# Patient Record
Sex: FEMALE | Race: OTHER | HISPANIC OR LATINO
[De-identification: names, ages, dates, MRNs, and addresses within clinical notes are randomized per-mention and may not be internally consistent; named-entity substitution may affect disease eponyms.]

---

## 2021-08-09 ENCOUNTER — APPOINTMENT (OUTPATIENT)
Dept: DERMATOLOGY | Facility: CLINIC | Age: 46
End: 2021-08-09
Payer: COMMERCIAL

## 2021-08-09 VITALS — BODY MASS INDEX: 22.44 KG/M2 | WEIGHT: 143 LBS | HEIGHT: 67 IN

## 2021-08-09 DIAGNOSIS — L81.1 CHLOASMA: ICD-10-CM

## 2021-08-09 PROCEDURE — 99204 OFFICE O/P NEW MOD 45 MIN: CPT

## 2021-08-09 RX ORDER — HYDROQUINONE 40 MG/G
4 CREAM TOPICAL TWICE DAILY
Qty: 1 | Refills: 0 | Status: ACTIVE | COMMUNITY
Start: 2021-08-09 | End: 1900-01-01

## 2023-09-25 ENCOUNTER — EMERGENCY (EMERGENCY)
Facility: HOSPITAL | Age: 48
LOS: 1 days | Discharge: HOME | End: 2023-09-25
Attending: STUDENT IN AN ORGANIZED HEALTH CARE EDUCATION/TRAINING PROGRAM
Payer: COMMERCIAL

## 2023-09-25 VITALS
WEIGHT: 143.08 LBS | OXYGEN SATURATION: 100 % | SYSTOLIC BLOOD PRESSURE: 117 MMHG | TEMPERATURE: 98 F | RESPIRATION RATE: 18 BRPM | HEART RATE: 77 BPM | DIASTOLIC BLOOD PRESSURE: 73 MMHG

## 2023-09-25 LAB
ALBUMIN SERPL ELPH-MCNC: 3.6 G/DL — SIGNIFICANT CHANGE UP (ref 3.5–5)
ALP SERPL-CCNC: 71 U/L — SIGNIFICANT CHANGE UP (ref 40–120)
ALT FLD-CCNC: 17 U/L DA — SIGNIFICANT CHANGE UP (ref 10–60)
ANION GAP SERPL CALC-SCNC: 6 MMOL/L — SIGNIFICANT CHANGE UP (ref 5–17)
APPEARANCE UR: CLEAR — SIGNIFICANT CHANGE UP
AST SERPL-CCNC: 19 U/L — SIGNIFICANT CHANGE UP (ref 10–40)
BASOPHILS # BLD AUTO: 0.03 K/UL — SIGNIFICANT CHANGE UP (ref 0–0.2)
BASOPHILS NFR BLD AUTO: 0.5 % — SIGNIFICANT CHANGE UP (ref 0–2)
BILIRUB SERPL-MCNC: 0.6 MG/DL — SIGNIFICANT CHANGE UP (ref 0.2–1.2)
BILIRUB UR-MCNC: NEGATIVE — SIGNIFICANT CHANGE UP
BUN SERPL-MCNC: 13 MG/DL — SIGNIFICANT CHANGE UP (ref 7–18)
CALCIUM SERPL-MCNC: 8.7 MG/DL — SIGNIFICANT CHANGE UP (ref 8.4–10.5)
CHLORIDE SERPL-SCNC: 108 MMOL/L — SIGNIFICANT CHANGE UP (ref 96–108)
CO2 SERPL-SCNC: 24 MMOL/L — SIGNIFICANT CHANGE UP (ref 22–31)
COLOR SPEC: YELLOW — SIGNIFICANT CHANGE UP
CREAT SERPL-MCNC: 0.69 MG/DL — SIGNIFICANT CHANGE UP (ref 0.5–1.3)
DIFF PNL FLD: NEGATIVE — SIGNIFICANT CHANGE UP
EGFR: 108 ML/MIN/1.73M2 — SIGNIFICANT CHANGE UP
EOSINOPHIL # BLD AUTO: 0.06 K/UL — SIGNIFICANT CHANGE UP (ref 0–0.5)
EOSINOPHIL NFR BLD AUTO: 1 % — SIGNIFICANT CHANGE UP (ref 0–6)
GLUCOSE SERPL-MCNC: 95 MG/DL — SIGNIFICANT CHANGE UP (ref 70–99)
GLUCOSE UR QL: NEGATIVE MG/DL — SIGNIFICANT CHANGE UP
HCG SERPL-ACNC: HIGH MIU/ML
HCG UR QL: POSITIVE
HCT VFR BLD CALC: 40 % — SIGNIFICANT CHANGE UP (ref 34.5–45)
HGB BLD-MCNC: 13.3 G/DL — SIGNIFICANT CHANGE UP (ref 11.5–15.5)
IMM GRANULOCYTES NFR BLD AUTO: 0.3 % — SIGNIFICANT CHANGE UP (ref 0–0.9)
KETONES UR-MCNC: ABNORMAL MG/DL
LEUKOCYTE ESTERASE UR-ACNC: NEGATIVE — SIGNIFICANT CHANGE UP
LIDOCAIN IGE QN: 43 U/L — SIGNIFICANT CHANGE UP (ref 13–75)
LYMPHOCYTES # BLD AUTO: 1.94 K/UL — SIGNIFICANT CHANGE UP (ref 1–3.3)
LYMPHOCYTES # BLD AUTO: 31.4 % — SIGNIFICANT CHANGE UP (ref 13–44)
MCHC RBC-ENTMCNC: 29.7 PG — SIGNIFICANT CHANGE UP (ref 27–34)
MCHC RBC-ENTMCNC: 33.3 GM/DL — SIGNIFICANT CHANGE UP (ref 32–36)
MCV RBC AUTO: 89.3 FL — SIGNIFICANT CHANGE UP (ref 80–100)
MONOCYTES # BLD AUTO: 0.37 K/UL — SIGNIFICANT CHANGE UP (ref 0–0.9)
MONOCYTES NFR BLD AUTO: 6 % — SIGNIFICANT CHANGE UP (ref 2–14)
NEUTROPHILS # BLD AUTO: 3.75 K/UL — SIGNIFICANT CHANGE UP (ref 1.8–7.4)
NEUTROPHILS NFR BLD AUTO: 60.8 % — SIGNIFICANT CHANGE UP (ref 43–77)
NITRITE UR-MCNC: NEGATIVE — SIGNIFICANT CHANGE UP
NRBC # BLD: 0 /100 WBCS — SIGNIFICANT CHANGE UP (ref 0–0)
PH UR: 5.5 — SIGNIFICANT CHANGE UP (ref 5–8)
PLATELET # BLD AUTO: 242 K/UL — SIGNIFICANT CHANGE UP (ref 150–400)
POTASSIUM SERPL-MCNC: 4.5 MMOL/L — SIGNIFICANT CHANGE UP (ref 3.5–5.3)
POTASSIUM SERPL-SCNC: 4.5 MMOL/L — SIGNIFICANT CHANGE UP (ref 3.5–5.3)
PROT SERPL-MCNC: 7.5 G/DL — SIGNIFICANT CHANGE UP (ref 6–8.3)
PROT UR-MCNC: NEGATIVE MG/DL — SIGNIFICANT CHANGE UP
RBC # BLD: 4.48 M/UL — SIGNIFICANT CHANGE UP (ref 3.8–5.2)
RBC # FLD: 13.3 % — SIGNIFICANT CHANGE UP (ref 10.3–14.5)
SODIUM SERPL-SCNC: 138 MMOL/L — SIGNIFICANT CHANGE UP (ref 135–145)
SP GR SPEC: 1.02 — SIGNIFICANT CHANGE UP (ref 1–1.03)
UROBILINOGEN FLD QL: 1 MG/DL — SIGNIFICANT CHANGE UP (ref 0.2–1)
WBC # BLD: 6.17 K/UL — SIGNIFICANT CHANGE UP (ref 3.8–10.5)
WBC # FLD AUTO: 6.17 K/UL — SIGNIFICANT CHANGE UP (ref 3.8–10.5)

## 2023-09-25 PROCEDURE — 76817 TRANSVAGINAL US OBSTETRIC: CPT | Mod: 26

## 2023-09-25 PROCEDURE — 76801 OB US < 14 WKS SINGLE FETUS: CPT | Mod: 26

## 2023-09-25 PROCEDURE — 99284 EMERGENCY DEPT VISIT MOD MDM: CPT

## 2023-09-25 RX ORDER — ACETAMINOPHEN 500 MG
650 TABLET ORAL ONCE
Refills: 0 | Status: COMPLETED | OUTPATIENT
Start: 2023-09-25 | End: 2023-09-25

## 2023-09-25 RX ORDER — SODIUM CHLORIDE 9 MG/ML
1000 INJECTION INTRAMUSCULAR; INTRAVENOUS; SUBCUTANEOUS ONCE
Refills: 0 | Status: COMPLETED | OUTPATIENT
Start: 2023-09-25 | End: 2023-09-25

## 2023-09-25 RX ADMIN — Medication 650 MILLIGRAM(S): at 22:12

## 2023-09-25 RX ADMIN — SODIUM CHLORIDE 1000 MILLILITER(S): 9 INJECTION INTRAMUSCULAR; INTRAVENOUS; SUBCUTANEOUS at 22:11

## 2023-09-25 NOTE — ED ADULT TRIAGE NOTE - CHIEF COMPLAINT QUOTE
I have lower abdominal pain, back pain, yellowish vaginal discharge x 1 week.  I have a positive pregnancy test on 09/22/3023

## 2023-09-25 NOTE — ED PROVIDER NOTE - ATTENDING APP SHARED VISIT CONTRIBUTION OF CARE
I, Avani Mccall DO reviewed the [facility] plan of care and discussed the case with the ACP.  I was available for any questions and concerns

## 2023-09-25 NOTE — ED PROVIDER NOTE - PATIENT PORTAL LINK FT
You can access the FollowMyHealth Patient Portal offered by [facility] by registering at the following website: [email or web address]. By joining [facility]’s FollowMyHealth portal, you will also be able to view your health information using other applications (apps) compatible with our system.

## 2023-09-25 NOTE — ED ADULT NURSE NOTE - NSSEPSISSUSPECTED_ED_A_ED
Patient was advised of below recommendations per Dr. Raul Davis    Patient states she is currently taking clindamycin 450mg three times daily as she just had a back procedure. States she will be done taking it on Wednesday. States she is wondering if she still needs to take more clindamycin before her dental appointment? Dental appointment is on 3/22. Please advise.    No

## 2023-09-25 NOTE — ED PROVIDER NOTE - NSFOLLOWUPINSTRUCTIONS_ED_ALL_ED_FT
1) Follow up with either your doctor within 48hrs or return to ED for the repeat blood work check.   2) Return to the ED immediately for new or worsening symptoms like severe abdominal pain, vomiting, fever, inability to eat or drink.   3) Please continue to take any home medications as prescribed  4) Your test results from your ED visit were discussed with you prior to discharge  5) You were provided with a copy of your test results    Abdominal Pain During Pregnancy  Abdominal pain is common during pregnancy and has many possible causes. Some causes are more serious than others, and sometimes the cause is not known.    Abdominal pain can be a sign that labor is starting. It can also be caused by normal growth of your baby causing stretching of muscles and ligaments during pregnancy. Always tell your health care provider if you have any abdominal pain.    Follow these instructions at home:    Do not have sex or put anything in your vagina until your pain goes away completely.  Get plenty of rest until your pain improves.  Drink enough fluid to keep your urine pale yellow.  Take over-the-counter and prescription medicines only as told by your health care provider.  Keep all follow-up visits. This is important.  Contact a health care provider if:  Your pain continues or gets worse after resting.  You have lower abdominal pain that:  Comes and goes at regular intervals.  Spreads to your back.  Is similar to menstrual cramps.  You have pain or burning when you urinate.  Get help right away if:  You have a fever, chills, or shortness of breath.  You have vaginal bleeding.  You are leaking fluid or passing tissue from your vagina.  You have vomiting or diarrhea that lasts for more than 24 hours.  Your baby is moving less than usual.  You feel very weak or faint.  You develop severe pain in your upper abdomen.  Summary  Abdominal pain is common during pregnancy and has many possible causes.  If you experience abdominal pain during pregnancy, tell your health care provider right away.  Follow your health care provider's home care instructions and keep all follow-up visits as told.  This information is not intended to replace advice given to you by your health care provider. Make sure you discuss any questions you have with your health care provider.

## 2023-09-25 NOTE — ED PROVIDER NOTE - CLINICAL SUMMARY MEDICAL DECISION MAKING FREE TEXT BOX
48 y/o F PMH ovarian cysts  currently approx 10 weeks pregnant presenting to ED for abd pain. urinary frequency x 1 week. Pt states her LMP was , was having hot flashes and thought she was beginning menopause. Took a pregnancy test on  and noted a "faint positive line." Was unable to make appt with outpatient PMD prompting ED arrival. Also endorsing yellow vaginal discharge but states this is not uncommon for her. No abnormal vaginal bleeding. Denies CP, SOB, fevers, n/v/d. PE notable for RLQ and suprapubic ttp, concerning for ectopic pregnancy, ovarian torsion, UTI, ovarian cysts, possibly appendicitis, if upreg negative, may require additional imaging to eval for pain, plan for labs, analgesia, US, reassess, dispo accordingly

## 2023-09-25 NOTE — ED PROVIDER NOTE - PROGRESS NOTE DETAILS
b-hcg 15,835. US showing Intrauterine gestational sac with mean sac diameter of 2.5 cm   corresponding to a 7 week 1 day gestation. No internal yolk sac or fetal   pole identified. Small subchorionic hematoma measuring 1.6 x 0.5 x 1.0   cm.     Discussed results with patient, aware she needs to follow up for rpt b-hcg and possible US, all questions answered and ectopic precautions given to patient.

## 2023-09-25 NOTE — ED PROVIDER NOTE - NS ED ROS FT
GENERAL: +CHILLS  EYES: No change in vision  HEENT: No trouble swallowing or speaking  CARDIAC: No chest pain  PULMONARY: No cough or SOB  GI: +ABD PAIN, no nausea or no vomiting, no diarrhea or constipation  : + URINARY FREQUENCY, +YELLOW VAGINAL DISCHARGE  SKIN: No rashes  NEURO: No headache, no numbness  MSK: No joint pain  Otherwise as HPI or negative.

## 2023-09-25 NOTE — ED PROVIDER NOTE - PHYSICAL EXAMINATION
Gen: NAD, AOx3, able to make needs known, non-toxic  Head: NCAT  HEENT: EOMI, oral mucosa moist, normal conjunctiva  Lung: CTAB, no respiratory distress, no wheezes/rhonchi/rales B/L, speaking in full sentences  CV: RRR, no murmurs  Abd: + RLQ and suprapubic tenderness, non distended, soft,  no guarding, no CVA tenderness  MSK: no visible deformities  Neuro: Appears non focal  Skin: Warm, well perfused, no rash  Psych: normal affect

## 2023-09-25 NOTE — ED ADULT NURSE NOTE - NSFALLUNIVINTERV_ED_ALL_ED
Bed/Stretcher in lowest position, wheels locked, appropriate side rails in place/Call bell, personal items and telephone in reach/Instruct patient to call for assistance before getting out of bed/chair/stretcher/Non-slip footwear applied when patient is off stretcher/[place] to call system/Physically safe environment - no spills, clutter or unnecessary equipment/Purposeful proactive rounding/Room/bathroom lighting operational, light cord in reach

## 2023-09-26 VITALS
TEMPERATURE: 99 F | OXYGEN SATURATION: 100 % | SYSTOLIC BLOOD PRESSURE: 117 MMHG | RESPIRATION RATE: 16 BRPM | HEART RATE: 68 BPM | DIASTOLIC BLOOD PRESSURE: 79 MMHG

## 2023-09-26 LAB
C TRACH RRNA SPEC QL NAA+PROBE: SIGNIFICANT CHANGE UP
CULTURE RESULTS: SIGNIFICANT CHANGE UP
N GONORRHOEA RRNA SPEC QL NAA+PROBE: SIGNIFICANT CHANGE UP
SPECIMEN SOURCE: SIGNIFICANT CHANGE UP

## 2023-09-26 PROCEDURE — 76830 TRANSVAGINAL US NON-OB: CPT

## 2023-09-26 PROCEDURE — 99284 EMERGENCY DEPT VISIT MOD MDM: CPT | Mod: 25

## 2023-09-26 PROCEDURE — 36415 COLL VENOUS BLD VENIPUNCTURE: CPT

## 2023-09-26 PROCEDURE — 83690 ASSAY OF LIPASE: CPT

## 2023-09-26 PROCEDURE — 81025 URINE PREGNANCY TEST: CPT

## 2023-09-26 PROCEDURE — 85025 COMPLETE CBC W/AUTO DIFF WBC: CPT

## 2023-09-26 PROCEDURE — 80053 COMPREHEN METABOLIC PANEL: CPT

## 2023-09-26 PROCEDURE — 87086 URINE CULTURE/COLONY COUNT: CPT

## 2023-09-26 PROCEDURE — 81003 URINALYSIS AUTO W/O SCOPE: CPT

## 2023-09-26 PROCEDURE — 87591 N.GONORRHOEAE DNA AMP PROB: CPT

## 2023-09-26 PROCEDURE — 76802 OB US < 14 WKS ADDL FETUS: CPT

## 2023-09-26 PROCEDURE — 87491 CHLMYD TRACH DNA AMP PROBE: CPT

## 2023-09-26 PROCEDURE — 84702 CHORIONIC GONADOTROPIN TEST: CPT

## 2024-04-28 ENCOUNTER — EMERGENCY (EMERGENCY)
Facility: HOSPITAL | Age: 49
LOS: 1 days | Discharge: HOME | End: 2024-04-28
Attending: EMERGENCY MEDICINE
Payer: COMMERCIAL

## 2024-04-28 VITALS
SYSTOLIC BLOOD PRESSURE: 123 MMHG | OXYGEN SATURATION: 99 % | HEIGHT: 68.5 IN | RESPIRATION RATE: 14 BRPM | TEMPERATURE: 98 F | DIASTOLIC BLOOD PRESSURE: 72 MMHG | HEART RATE: 76 BPM | WEIGHT: 138.67 LBS

## 2024-04-28 PROCEDURE — 96374 THER/PROPH/DIAG INJ IV PUSH: CPT

## 2024-04-28 PROCEDURE — 99284 EMERGENCY DEPT VISIT MOD MDM: CPT | Mod: 25

## 2024-04-28 PROCEDURE — 99284 EMERGENCY DEPT VISIT MOD MDM: CPT

## 2024-04-28 PROCEDURE — 70450 CT HEAD/BRAIN W/O DYE: CPT | Mod: 26,MC

## 2024-04-28 PROCEDURE — 96375 TX/PRO/DX INJ NEW DRUG ADDON: CPT

## 2024-04-28 PROCEDURE — 70450 CT HEAD/BRAIN W/O DYE: CPT | Mod: MC

## 2024-04-28 RX ORDER — METOCLOPRAMIDE HCL 10 MG
10 TABLET ORAL ONCE
Refills: 0 | Status: COMPLETED | OUTPATIENT
Start: 2024-04-28 | End: 2024-04-28

## 2024-04-28 RX ORDER — SODIUM CHLORIDE 9 MG/ML
1000 INJECTION INTRAMUSCULAR; INTRAVENOUS; SUBCUTANEOUS ONCE
Refills: 0 | Status: COMPLETED | OUTPATIENT
Start: 2024-04-28 | End: 2024-04-28

## 2024-04-28 RX ORDER — KETOROLAC TROMETHAMINE 30 MG/ML
15 SYRINGE (ML) INJECTION ONCE
Refills: 0 | Status: DISCONTINUED | OUTPATIENT
Start: 2024-04-28 | End: 2024-04-28

## 2024-04-28 RX ORDER — ACETAMINOPHEN 500 MG
1000 TABLET ORAL ONCE
Refills: 0 | Status: COMPLETED | OUTPATIENT
Start: 2024-04-28 | End: 2024-04-28

## 2024-04-28 RX ADMIN — Medication 15 MILLIGRAM(S): at 14:13

## 2024-04-28 RX ADMIN — Medication 10 MILLIGRAM(S): at 12:35

## 2024-04-28 RX ADMIN — Medication 400 MILLIGRAM(S): at 12:34

## 2024-04-28 RX ADMIN — SODIUM CHLORIDE 1000 MILLILITER(S): 9 INJECTION INTRAMUSCULAR; INTRAVENOUS; SUBCUTANEOUS at 12:34

## 2024-04-28 RX ADMIN — Medication 1000 MILLIGRAM(S): at 13:04

## 2024-04-28 RX ADMIN — Medication 15 MILLIGRAM(S): at 14:35

## 2024-04-28 NOTE — ED PROVIDER NOTE - PATIENT PORTAL LINK FT
You can access the FollowMyHealth Patient Portal offered by [facility] by registering at the following website: [email or web address]. By joining [facility]’s FollowMyHealth portal, you will also be able to view your health information using other applications (apps) compatible with our system.

## 2024-04-28 NOTE — ED PROVIDER NOTE - ATTENDING APP SHARED VISIT CONTRIBUTION OF CARE
Seen with ACP complaining of 3-day history of intractable headache with nausea not tolerating p.o. patient has a history of headaches had an MRI in the past which was negative physical exam is unremarkable patient in moderate distress no neurodeficits patient given IV Tylenol and Reglan with improvement CT scan is negative agree with ACPs assessment history and physical and [id number]!

## 2024-04-28 NOTE — ED PROVIDER NOTE - CLINICAL SUMMARY MEDICAL DECISION MAKING FREE TEXT BOX
48-year-old female, presents for evaluation of persistent headache for 3 days.  Nontoxic-appearing, vital signs stable, afebrile.  No signs of meningeal irritation.  Unremarkable neurological exam.  No cerebellar deficit.  Patient will require a CT to assess for acute intracranial injury.  At this time there is a low suspicion for  dissection or meningitis.  Will give IV meds, IV fluids and reassess. 48-year-old female, presents for evaluation of persistent headache for 3 days.  Nontoxic-appearing, vital signs stable, afebrile.  No signs of meningeal irritation.  Unremarkable neurological exam.  No cerebellar deficit.  Patient will require a CT to assess for acute intracranial injury.  At this time there is a low suspicion for  dissection or meningitis.  Will give IV meds, IV fluids and reassess.    14: 09–patient feels significant improvement of headache although not completely resolved.  Again unremarkable neurological exam.  CT head negative.  Patient already has neurology follow-up this coming Wednesday.  Discussed red flags to come back to the hospital and home care.  Answered all questions.

## 2024-04-28 NOTE — ED ADULT NURSE NOTE - OBJECTIVE STATEMENT
Pt presents to the ED with c/o headache x 1 week and nausea and dizziness yesterday. Pt report taking aleve without improvement. Last took aleve at 9am yesterday.

## 2024-04-28 NOTE — ED PROVIDER NOTE - PHYSICAL EXAMINATION
Head is normocephalic and atraumatic. No head tenderness or skull depression on palpation. No temporal cord-like sensation, increased warmth or tenderness. Pt is alert and oriented x 3, able to follow commands. No facial asymmetry. Pupils 5 mm equally round with PERRL, no nystagmus, EOM intact. Cranial nerves III-XII grossly intact. Coordination nose-to-finger intact. All limbs equally strong bilaterally 5/5. No pronator drift. No spinal/paraspinal tenderness. Neck is non-tender, supple with full range of motion. No nuchal rigidity.

## 2024-04-28 NOTE — ED PROVIDER NOTE - OBJECTIVE STATEMENT
48-year-old female, history of migraine (usually takes Aleve, was evaluated by neurologist in the past and had MRI over 10 years ago), presents with persistent headache for 3 days.  Reports gradual onset of nonexertional headache 1 week ago.  No specific triggers.  Headache localized in the generalized area of the head and sometimes across the forehead/neck/shoulders.  At times describes headache as throbbing. During the week Aleve was able to alleviate partially her symptoms but in the last 3 days it has been continuous.  Had 2 episodes of NBNB vomiting in the last 1 week.   Also reports had episodic right hand or right foot numbness that lasted for 1 hour.  No associated weakness.  No recent paresthesia in the last few days.  Denies any recent febrile or viral illness.  Denies any vision changes, photophobia, unsteady gait, dizziness difficulty talking  or any other complaints.  Reports the headache presentation is similar but this time it is much more intense and more persistent.

## 2024-04-28 NOTE — ED ADULT NURSE NOTE - ED STAT RN HANDOFF DETAILS
Patient discharged home as per MD order. IV access removed no redness, swelling, or bleeding noted at site. All discharge instructions provided to patient. Patient verbalized understanding and left ambulatory in no distress.

## 2024-04-28 NOTE — ED PROVIDER NOTE - NSFOLLOWUPINSTRUCTIONS_ED_ALL_ED_FT
Follow-up with the neurologist as per your appointment.  For pain you can take Aleve 500 mg orally every 12 hours in combination with Tylenol 1000 mg orally every 6 hours as needed for pain/fever (max 4000 mg in 24 hours).    If you experience any new or worsening symptoms or if you are concerned you can always come back to the emergency for a re-evaluation.    Some results may not be available at the time of your discharge from the hospital. You can download the FOLLOW MY HEALTH trinity and have access to these results.

## 2025-06-09 ENCOUNTER — EMERGENCY (EMERGENCY)
Facility: HOSPITAL | Age: 50
LOS: 1 days | End: 2025-06-09
Attending: EMERGENCY MEDICINE
Payer: COMMERCIAL

## 2025-06-09 VITALS
DIASTOLIC BLOOD PRESSURE: 69 MMHG | SYSTOLIC BLOOD PRESSURE: 104 MMHG | TEMPERATURE: 98 F | OXYGEN SATURATION: 98 % | HEART RATE: 58 BPM | RESPIRATION RATE: 18 BRPM

## 2025-06-09 VITALS
DIASTOLIC BLOOD PRESSURE: 80 MMHG | HEART RATE: 75 BPM | TEMPERATURE: 98 F | SYSTOLIC BLOOD PRESSURE: 125 MMHG | OXYGEN SATURATION: 100 % | RESPIRATION RATE: 18 BRPM | HEIGHT: 67 IN | WEIGHT: 158.73 LBS

## 2025-06-09 LAB
ALBUMIN SERPL ELPH-MCNC: 3.2 G/DL — LOW (ref 3.5–5)
ALP SERPL-CCNC: 89 U/L — SIGNIFICANT CHANGE UP (ref 40–120)
ALT FLD-CCNC: 22 U/L DA — SIGNIFICANT CHANGE UP (ref 10–60)
ANION GAP SERPL CALC-SCNC: 3 MMOL/L — LOW (ref 5–17)
APPEARANCE UR: ABNORMAL
AST SERPL-CCNC: 15 U/L — SIGNIFICANT CHANGE UP (ref 10–40)
BASOPHILS # BLD AUTO: 0.03 K/UL — SIGNIFICANT CHANGE UP (ref 0–0.2)
BASOPHILS NFR BLD AUTO: 0.6 % — SIGNIFICANT CHANGE UP (ref 0–2)
BILIRUB SERPL-MCNC: 0.5 MG/DL — SIGNIFICANT CHANGE UP (ref 0.2–1.2)
BILIRUB UR-MCNC: NEGATIVE — SIGNIFICANT CHANGE UP
BUN SERPL-MCNC: 13 MG/DL — SIGNIFICANT CHANGE UP (ref 7–18)
CALCIUM SERPL-MCNC: 8.2 MG/DL — LOW (ref 8.4–10.5)
CHLORIDE SERPL-SCNC: 108 MMOL/L — SIGNIFICANT CHANGE UP (ref 96–108)
CK SERPL-CCNC: 111 U/L — SIGNIFICANT CHANGE UP (ref 21–215)
CO2 SERPL-SCNC: 29 MMOL/L — SIGNIFICANT CHANGE UP (ref 22–31)
COLOR SPEC: YELLOW — SIGNIFICANT CHANGE UP
CREAT SERPL-MCNC: 0.77 MG/DL — SIGNIFICANT CHANGE UP (ref 0.5–1.3)
DIFF PNL FLD: ABNORMAL
EGFR: 94 ML/MIN/1.73M2 — SIGNIFICANT CHANGE UP
EGFR: 94 ML/MIN/1.73M2 — SIGNIFICANT CHANGE UP
EOSINOPHIL # BLD AUTO: 0.21 K/UL — SIGNIFICANT CHANGE UP (ref 0–0.5)
EOSINOPHIL NFR BLD AUTO: 4.5 % — SIGNIFICANT CHANGE UP (ref 0–6)
FLUAV AG NPH QL: SIGNIFICANT CHANGE UP
FLUBV AG NPH QL: SIGNIFICANT CHANGE UP
GLUCOSE SERPL-MCNC: 146 MG/DL — HIGH (ref 70–99)
GLUCOSE UR QL: NEGATIVE MG/DL — SIGNIFICANT CHANGE UP
HCG SERPL-ACNC: <1 MIU/ML — SIGNIFICANT CHANGE UP
HCT VFR BLD CALC: 34.4 % — LOW (ref 34.5–45)
HGB BLD-MCNC: 11.4 G/DL — LOW (ref 11.5–15.5)
IMM GRANULOCYTES NFR BLD AUTO: 0.2 % — SIGNIFICANT CHANGE UP (ref 0–0.9)
KETONES UR QL: NEGATIVE MG/DL — SIGNIFICANT CHANGE UP
LEUKOCYTE ESTERASE UR-ACNC: ABNORMAL
LYMPHOCYTES # BLD AUTO: 1.57 K/UL — SIGNIFICANT CHANGE UP (ref 1–3.3)
LYMPHOCYTES # BLD AUTO: 34 % — SIGNIFICANT CHANGE UP (ref 13–44)
MCHC RBC-ENTMCNC: 29.6 PG — SIGNIFICANT CHANGE UP (ref 27–34)
MCHC RBC-ENTMCNC: 33.1 G/DL — SIGNIFICANT CHANGE UP (ref 32–36)
MCV RBC AUTO: 89.4 FL — SIGNIFICANT CHANGE UP (ref 80–100)
MONOCYTES # BLD AUTO: 0.44 K/UL — SIGNIFICANT CHANGE UP (ref 0–0.9)
MONOCYTES NFR BLD AUTO: 9.5 % — SIGNIFICANT CHANGE UP (ref 2–14)
NEUTROPHILS # BLD AUTO: 2.36 K/UL — SIGNIFICANT CHANGE UP (ref 1.8–7.4)
NEUTROPHILS NFR BLD AUTO: 51.2 % — SIGNIFICANT CHANGE UP (ref 43–77)
NITRITE UR-MCNC: NEGATIVE — SIGNIFICANT CHANGE UP
NRBC BLD AUTO-RTO: 0 /100 WBCS — SIGNIFICANT CHANGE UP (ref 0–0)
PH UR: 7 — SIGNIFICANT CHANGE UP (ref 5–8)
PLATELET # BLD AUTO: 237 K/UL — SIGNIFICANT CHANGE UP (ref 150–400)
POTASSIUM SERPL-MCNC: 3.4 MMOL/L — LOW (ref 3.5–5.3)
POTASSIUM SERPL-SCNC: 3.4 MMOL/L — LOW (ref 3.5–5.3)
PROT SERPL-MCNC: 6.4 G/DL — SIGNIFICANT CHANGE UP (ref 6–8.3)
PROT UR-MCNC: ABNORMAL MG/DL
RBC # BLD: 3.85 M/UL — SIGNIFICANT CHANGE UP (ref 3.8–5.2)
RBC # FLD: 13.9 % — SIGNIFICANT CHANGE UP (ref 10.3–14.5)
RSV RNA NPH QL NAA+NON-PROBE: SIGNIFICANT CHANGE UP
SARS-COV-2 RNA SPEC QL NAA+PROBE: SIGNIFICANT CHANGE UP
SODIUM SERPL-SCNC: 140 MMOL/L — SIGNIFICANT CHANGE UP (ref 135–145)
SOURCE RESPIRATORY: SIGNIFICANT CHANGE UP
SP GR SPEC: 1.02 — SIGNIFICANT CHANGE UP (ref 1–1.03)
TROPONIN I, HIGH SENSITIVITY RESULT: 3.5 NG/L — SIGNIFICANT CHANGE UP
UROBILINOGEN FLD QL: 1 MG/DL — SIGNIFICANT CHANGE UP (ref 0.2–1)
WBC # BLD: 4.62 K/UL — SIGNIFICANT CHANGE UP (ref 3.8–10.5)
WBC # FLD AUTO: 4.62 K/UL — SIGNIFICANT CHANGE UP (ref 3.8–10.5)

## 2025-06-09 PROCEDURE — 96375 TX/PRO/DX INJ NEW DRUG ADDON: CPT

## 2025-06-09 PROCEDURE — 82550 ASSAY OF CK (CPK): CPT

## 2025-06-09 PROCEDURE — 87637 SARSCOV2&INF A&B&RSV AMP PRB: CPT

## 2025-06-09 PROCEDURE — 84702 CHORIONIC GONADOTROPIN TEST: CPT

## 2025-06-09 PROCEDURE — 87086 URINE CULTURE/COLONY COUNT: CPT

## 2025-06-09 PROCEDURE — 84484 ASSAY OF TROPONIN QUANT: CPT

## 2025-06-09 PROCEDURE — 85025 COMPLETE CBC W/AUTO DIFF WBC: CPT

## 2025-06-09 PROCEDURE — 71046 X-RAY EXAM CHEST 2 VIEWS: CPT | Mod: 26

## 2025-06-09 PROCEDURE — 0241U: CPT

## 2025-06-09 PROCEDURE — 99285 EMERGENCY DEPT VISIT HI MDM: CPT | Mod: 25

## 2025-06-09 PROCEDURE — 80053 COMPREHEN METABOLIC PANEL: CPT

## 2025-06-09 PROCEDURE — 71046 X-RAY EXAM CHEST 2 VIEWS: CPT

## 2025-06-09 PROCEDURE — 93005 ELECTROCARDIOGRAM TRACING: CPT

## 2025-06-09 PROCEDURE — 96374 THER/PROPH/DIAG INJ IV PUSH: CPT

## 2025-06-09 PROCEDURE — 81001 URINALYSIS AUTO W/SCOPE: CPT

## 2025-06-09 PROCEDURE — 87077 CULTURE AEROBIC IDENTIFY: CPT

## 2025-06-09 PROCEDURE — 93010 ELECTROCARDIOGRAM REPORT: CPT

## 2025-06-09 PROCEDURE — 99285 EMERGENCY DEPT VISIT HI MDM: CPT

## 2025-06-09 PROCEDURE — 36415 COLL VENOUS BLD VENIPUNCTURE: CPT

## 2025-06-09 RX ORDER — METOCLOPRAMIDE HCL 10 MG
10 TABLET ORAL ONCE
Refills: 0 | Status: COMPLETED | OUTPATIENT
Start: 2025-06-09 | End: 2025-06-09

## 2025-06-09 RX ORDER — ACETAMINOPHEN 500 MG/5ML
1000 LIQUID (ML) ORAL ONCE
Refills: 0 | Status: COMPLETED | OUTPATIENT
Start: 2025-06-09 | End: 2025-06-09

## 2025-06-09 RX ADMIN — Medication 400 MILLIGRAM(S): at 18:52

## 2025-06-09 RX ADMIN — Medication 10 MILLIGRAM(S): at 18:51

## 2025-06-09 NOTE — ED PROVIDER NOTE - CLINICAL SUMMARY MEDICAL DECISION MAKING FREE TEXT BOX
This is a 49-year-old female, no significant medical history, presenting to the emergency department today for palpitations.  The patient states over the past 3 weeks she has had intensifying joint pains, she has also had 4 days of headache, and yesterday she passed out, no LOC, no head strike, because she felt a shock in her head prior to passing out.  The patient states that she has not had the symptoms before.  The patient denies any sick contact.  The patient denies any recent fevers, chills, shortness of breath, nausea or vomiting.  The patient states that she however has had occasional episodes of nausea with her symptoms but at this time is not currently nauseous.  The patient is endorsing a mild HA at this time. The patient states that she has been taking Advil for her symptoms with minimal relief. ROS is otherwise negative.    VSS  PE.   GENERAL: Awake, alert, NAD  HEENT: NC/AT, moist mucous membranes, PERRL, EOMI  LUNGS: CTAB, no wheezes or crackles   CARDIAC: RRR, no m/r/g  ABDOMEN: Soft, +suprapubic TTP, non distended, no rebound, no guarding  BACK: No midline spinal tenderness, no CVA tenderness  EXT: No edema, no calf tenderness, 2+ DP pulses bilaterally, no deformities.  NEURO: A&Ox3. Moving all extremities.  SKIN: Warm and dry. No rash.  PSYCH: Normal affect.    The differential includes but is not limited to electrolyte/hematological derangements, rhabdomyolysis, urinary tract infection, pneumonia, will evaluate for ACS, viral infection.  Will obtain basic labs, cardiac labs, EKG, chest x-ray, UA, UC, CK.  Final disposition pending labs imaging and reassessment.

## 2025-06-09 NOTE — ED PROVIDER NOTE - ATTENDING CONTRIBUTION TO CARE
Agree with resident H&P.  49-year-old female, with no significant PMHx presenting to the emergency department today for palpitations.  The patient also reporting joint pains, headache, and a syncopal episode yesterday.  Pt denying head trauma.   Exam as per resident.  Will check labs, supportive care, and reassess.

## 2025-06-09 NOTE — ED ADULT TRIAGE NOTE - CHIEF COMPLAINT QUOTE
Palpitations, low BP and syncopal episode yesterday. Palpitations, Increased weakness and general joints pain

## 2025-06-09 NOTE — ED PROVIDER NOTE - PROGRESS NOTE DETAILS
Xiomara Villagran, PGY-3 DO:  Patient re-evaluated at bedside and is now currently feeling better after treatment. Patient TBDC.

## 2025-06-09 NOTE — ED ADULT NURSE NOTE - NSFALLUNIVINTERV_ED_ALL_ED
Bed/Stretcher in lowest position, wheels locked, appropriate side rails in place/Call bell, personal items and telephone in reach/Instruct patient to call for assistance before getting out of bed/chair/stretcher/Non-slip footwear applied when patient is off stretcher/[place] to call system/Physically safe environment - no spills, clutter or unnecessary equipment/Purposeful proactive rounding/Room/bathroom lighting operational, light cord in reach

## 2025-06-09 NOTE — ED PROVIDER NOTE - NSFOLLOWUPINSTRUCTIONS_ED_ALL_ED_FT
Thank you for visiting the emergency department today.  In the emergency department you were seen for joint pain, headache, and an episode of passing out.,     Here in the emergency department you had blood work done as well as you were given pain medication.  On reassessment your symptoms improved.    We do not have an exact cause as to why you are having the symptoms you are having today.     You will need to follow-up with your primary care doctor for further evaluation and management if your symptoms do persist.      Please return to the emergency department immediately if you begin to have chest pain, shortness of breath, you cannot walk, you are not able to perform your daily activities.  In the meantime please stay hydrated, drink lots of fluid, and get rest.    You may take 650 mg of Tylenol every 6 hours as needed for your pain.  May also take 600 mg of Motrin with food every 6 hours as needed for your pain.

## 2025-06-09 NOTE — ED ADULT NURSE NOTE - OBJECTIVE STATEMENT
Patient c/o palpitations with chest pressure,  nausea x5 days, and syncopal episode yesterday. Denies chest pain/SOB, vomiting, diarrhea, chills, fever

## 2025-06-09 NOTE — ED PROVIDER NOTE - PATIENT PORTAL LINK FT
You can access the FollowMyHealth Patient Portal offered by [facility] by registering at the following website: [email or web address]. By joining [facility]’s FollowMyHealth portal, you will also be able to view your health information using other applications (apps) compatible with our system.

## 2025-06-11 LAB
CULTURE RESULTS: ABNORMAL
SPECIMEN SOURCE: SIGNIFICANT CHANGE UP

## 2025-06-12 RX ORDER — CEPHALEXIN 250 MG/1
1 CAPSULE ORAL
Qty: 14 | Refills: 0
Start: 2025-06-12 | End: 2025-06-18

## 2025-09-12 ENCOUNTER — EMERGENCY (EMERGENCY)
Facility: HOSPITAL | Age: 50
LOS: 1 days | End: 2025-09-12
Attending: STUDENT IN AN ORGANIZED HEALTH CARE EDUCATION/TRAINING PROGRAM
Payer: COMMERCIAL

## 2025-09-12 VITALS
OXYGEN SATURATION: 97 % | WEIGHT: 156.53 LBS | HEIGHT: 67 IN | HEART RATE: 60 BPM | SYSTOLIC BLOOD PRESSURE: 129 MMHG | RESPIRATION RATE: 18 BRPM | TEMPERATURE: 99 F | DIASTOLIC BLOOD PRESSURE: 86 MMHG

## 2025-09-12 LAB
ALBUMIN SERPL ELPH-MCNC: 3.3 G/DL — LOW (ref 3.5–5)
ALP SERPL-CCNC: 94 U/L — SIGNIFICANT CHANGE UP (ref 40–120)
ALT FLD-CCNC: 23 U/L DA — SIGNIFICANT CHANGE UP (ref 10–60)
ANION GAP SERPL CALC-SCNC: 5 MMOL/L — SIGNIFICANT CHANGE UP (ref 5–17)
APTT BLD: 27.6 SEC — SIGNIFICANT CHANGE UP (ref 26.1–36.8)
AST SERPL-CCNC: 15 U/L — SIGNIFICANT CHANGE UP (ref 10–40)
BASOPHILS # BLD AUTO: 0.03 K/UL — SIGNIFICANT CHANGE UP (ref 0–0.2)
BASOPHILS NFR BLD AUTO: 0.6 % — SIGNIFICANT CHANGE UP (ref 0–2)
BILIRUB SERPL-MCNC: 0.6 MG/DL — SIGNIFICANT CHANGE UP (ref 0.2–1.2)
BUN SERPL-MCNC: 15 MG/DL — SIGNIFICANT CHANGE UP (ref 7–18)
CALCIUM SERPL-MCNC: 8.7 MG/DL — SIGNIFICANT CHANGE UP (ref 8.4–10.5)
CHLORIDE SERPL-SCNC: 110 MMOL/L — HIGH (ref 96–108)
CO2 SERPL-SCNC: 25 MMOL/L — SIGNIFICANT CHANGE UP (ref 22–31)
CREAT SERPL-MCNC: 0.77 MG/DL — SIGNIFICANT CHANGE UP (ref 0.5–1.3)
EGFR: 94 ML/MIN/1.73M2 — SIGNIFICANT CHANGE UP
EGFR: 94 ML/MIN/1.73M2 — SIGNIFICANT CHANGE UP
EOSINOPHIL # BLD AUTO: 0.12 K/UL — SIGNIFICANT CHANGE UP (ref 0–0.5)
EOSINOPHIL NFR BLD AUTO: 2.3 % — SIGNIFICANT CHANGE UP (ref 0–6)
GLUCOSE SERPL-MCNC: 95 MG/DL — SIGNIFICANT CHANGE UP (ref 70–99)
HCG SERPL-ACNC: <1 MIU/ML — SIGNIFICANT CHANGE UP
HCT VFR BLD CALC: 34.1 % — LOW (ref 34.5–45)
HGB BLD-MCNC: 11.6 G/DL — SIGNIFICANT CHANGE UP (ref 11.5–15.5)
IMM GRANULOCYTES NFR BLD AUTO: 0.2 % — SIGNIFICANT CHANGE UP (ref 0–0.9)
INR BLD: 1.06 RATIO — SIGNIFICANT CHANGE UP (ref 0.85–1.16)
LYMPHOCYTES # BLD AUTO: 1.67 K/UL — SIGNIFICANT CHANGE UP (ref 1–3.3)
LYMPHOCYTES # BLD AUTO: 32.7 % — SIGNIFICANT CHANGE UP (ref 13–44)
MCHC RBC-ENTMCNC: 30.1 PG — SIGNIFICANT CHANGE UP (ref 27–34)
MCHC RBC-ENTMCNC: 34 G/DL — SIGNIFICANT CHANGE UP (ref 32–36)
MCV RBC AUTO: 88.6 FL — SIGNIFICANT CHANGE UP (ref 80–100)
MONOCYTES # BLD AUTO: 0.45 K/UL — SIGNIFICANT CHANGE UP (ref 0–0.9)
MONOCYTES NFR BLD AUTO: 8.8 % — SIGNIFICANT CHANGE UP (ref 2–14)
NEUTROPHILS # BLD AUTO: 2.83 K/UL — SIGNIFICANT CHANGE UP (ref 1.8–7.4)
NEUTROPHILS NFR BLD AUTO: 55.4 % — SIGNIFICANT CHANGE UP (ref 43–77)
NRBC BLD AUTO-RTO: 0 /100 WBCS — SIGNIFICANT CHANGE UP (ref 0–0)
NT-PROBNP SERPL-SCNC: 56 PG/ML — SIGNIFICANT CHANGE UP (ref 0–125)
PLATELET # BLD AUTO: 256 K/UL — SIGNIFICANT CHANGE UP (ref 150–400)
POTASSIUM SERPL-MCNC: 3.9 MMOL/L — SIGNIFICANT CHANGE UP (ref 3.5–5.3)
POTASSIUM SERPL-SCNC: 3.9 MMOL/L — SIGNIFICANT CHANGE UP (ref 3.5–5.3)
PROT SERPL-MCNC: 6.7 G/DL — SIGNIFICANT CHANGE UP (ref 6–8.3)
PROTHROM AB SERPL-ACNC: 12.3 SEC — SIGNIFICANT CHANGE UP (ref 9.9–13.4)
RBC # BLD: 3.85 M/UL — SIGNIFICANT CHANGE UP (ref 3.8–5.2)
RBC # FLD: 13.7 % — SIGNIFICANT CHANGE UP (ref 10.3–14.5)
SODIUM SERPL-SCNC: 140 MMOL/L — SIGNIFICANT CHANGE UP (ref 135–145)
WBC # BLD: 5.11 K/UL — SIGNIFICANT CHANGE UP (ref 3.8–10.5)
WBC # FLD AUTO: 5.11 K/UL — SIGNIFICANT CHANGE UP (ref 3.8–10.5)

## 2025-09-12 PROCEDURE — 36415 COLL VENOUS BLD VENIPUNCTURE: CPT

## 2025-09-12 PROCEDURE — 80053 COMPREHEN METABOLIC PANEL: CPT

## 2025-09-12 PROCEDURE — 85730 THROMBOPLASTIN TIME PARTIAL: CPT

## 2025-09-12 PROCEDURE — 93970 EXTREMITY STUDY: CPT | Mod: 26

## 2025-09-12 PROCEDURE — 71045 X-RAY EXAM CHEST 1 VIEW: CPT | Mod: 26

## 2025-09-12 PROCEDURE — 83880 ASSAY OF NATRIURETIC PEPTIDE: CPT

## 2025-09-12 PROCEDURE — 85610 PROTHROMBIN TIME: CPT

## 2025-09-12 PROCEDURE — 85025 COMPLETE CBC W/AUTO DIFF WBC: CPT

## 2025-09-12 PROCEDURE — 93970 EXTREMITY STUDY: CPT

## 2025-09-12 PROCEDURE — 84702 CHORIONIC GONADOTROPIN TEST: CPT

## 2025-09-12 PROCEDURE — 71045 X-RAY EXAM CHEST 1 VIEW: CPT

## 2025-09-12 PROCEDURE — 99285 EMERGENCY DEPT VISIT HI MDM: CPT

## 2025-09-13 VITALS
SYSTOLIC BLOOD PRESSURE: 104 MMHG | HEART RATE: 59 BPM | TEMPERATURE: 98 F | RESPIRATION RATE: 18 BRPM | DIASTOLIC BLOOD PRESSURE: 63 MMHG | OXYGEN SATURATION: 97 %

## 2025-09-13 LAB
APPEARANCE UR: CLEAR — SIGNIFICANT CHANGE UP
BACTERIA # UR AUTO: ABNORMAL /HPF
BILIRUB UR-MCNC: NEGATIVE — SIGNIFICANT CHANGE UP
COLOR SPEC: YELLOW — SIGNIFICANT CHANGE UP
COMMENT - URINE: SIGNIFICANT CHANGE UP
DIFF PNL FLD: NEGATIVE — SIGNIFICANT CHANGE UP
EPI CELLS # UR: PRESENT
GLUCOSE UR QL: NEGATIVE MG/DL — SIGNIFICANT CHANGE UP
KETONES UR QL: NEGATIVE MG/DL — SIGNIFICANT CHANGE UP
LEUKOCYTE ESTERASE UR-ACNC: ABNORMAL
NITRITE UR-MCNC: NEGATIVE — SIGNIFICANT CHANGE UP
PH UR: 5.5 — SIGNIFICANT CHANGE UP (ref 5–8)
PROT UR-MCNC: NEGATIVE MG/DL — SIGNIFICANT CHANGE UP
RBC CASTS # UR COMP ASSIST: 0 /HPF — SIGNIFICANT CHANGE UP (ref 0–4)
SP GR SPEC: 1.01 — SIGNIFICANT CHANGE UP (ref 1–1.03)
UROBILINOGEN FLD QL: 0.2 MG/DL — SIGNIFICANT CHANGE UP (ref 0.2–1)
WBC UR QL: 4 /HPF — SIGNIFICANT CHANGE UP (ref 0–5)

## 2025-09-13 PROCEDURE — 36415 COLL VENOUS BLD VENIPUNCTURE: CPT

## 2025-09-13 PROCEDURE — 85730 THROMBOPLASTIN TIME PARTIAL: CPT

## 2025-09-13 PROCEDURE — 74177 CT ABD & PELVIS W/CONTRAST: CPT

## 2025-09-13 PROCEDURE — 93970 EXTREMITY STUDY: CPT

## 2025-09-13 PROCEDURE — 85025 COMPLETE CBC W/AUTO DIFF WBC: CPT

## 2025-09-13 PROCEDURE — 83880 ASSAY OF NATRIURETIC PEPTIDE: CPT

## 2025-09-13 PROCEDURE — 80053 COMPREHEN METABOLIC PANEL: CPT

## 2025-09-13 PROCEDURE — 84702 CHORIONIC GONADOTROPIN TEST: CPT

## 2025-09-13 PROCEDURE — 81001 URINALYSIS AUTO W/SCOPE: CPT

## 2025-09-13 PROCEDURE — 74177 CT ABD & PELVIS W/CONTRAST: CPT | Mod: 26

## 2025-09-13 PROCEDURE — 71045 X-RAY EXAM CHEST 1 VIEW: CPT

## 2025-09-13 PROCEDURE — 99285 EMERGENCY DEPT VISIT HI MDM: CPT | Mod: 25

## 2025-09-13 PROCEDURE — 85610 PROTHROMBIN TIME: CPT

## 2025-09-13 RX ORDER — TAMSULOSIN HYDROCHLORIDE 0.4 MG/1
1 CAPSULE ORAL
Qty: 14 | Refills: 0
Start: 2025-09-13 | End: 2025-09-26

## 2025-09-13 RX ORDER — NAPROXEN SODIUM 275 MG
1 TABLET ORAL
Qty: 10 | Refills: 0
Start: 2025-09-13 | End: 2025-09-17